# Patient Record
Sex: MALE | Race: WHITE | NOT HISPANIC OR LATINO | Employment: UNEMPLOYED | URBAN - NONMETROPOLITAN AREA
[De-identification: names, ages, dates, MRNs, and addresses within clinical notes are randomized per-mention and may not be internally consistent; named-entity substitution may affect disease eponyms.]

---
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
## 2025-01-31 ENCOUNTER — OFFICE VISIT (OUTPATIENT)
Dept: INTERNAL MEDICINE | Facility: CLINIC | Age: 25
End: 2025-01-31
Payer: COMMERCIAL

## 2025-01-31 ENCOUNTER — HOSPITAL ENCOUNTER (OUTPATIENT)
Dept: GENERAL RADIOLOGY | Facility: HOSPITAL | Age: 25
Discharge: HOME | End: 2025-01-31
Admitting: INTERNAL MEDICINE
Payer: COMMERCIAL

## 2025-01-31 VITALS
SYSTOLIC BLOOD PRESSURE: 130 MMHG | BODY MASS INDEX: 23.85 KG/M2 | TEMPERATURE: 97.1 F | WEIGHT: 161 LBS | HEART RATE: 84 BPM | DIASTOLIC BLOOD PRESSURE: 84 MMHG | OXYGEN SATURATION: 99 % | HEIGHT: 69 IN

## 2025-01-31 DIAGNOSIS — M54.50 CHRONIC MIDLINE LOW BACK PAIN WITHOUT SCIATICA: ICD-10-CM

## 2025-01-31 DIAGNOSIS — S61.239A PUNCTURE WOUND OF FINGER, INITIAL ENCOUNTER: ICD-10-CM

## 2025-01-31 DIAGNOSIS — Z76.89 ENCOUNTER TO ESTABLISH CARE WITH NEW DOCTOR: Primary | ICD-10-CM

## 2025-01-31 DIAGNOSIS — G89.29 CHRONIC MIDLINE LOW BACK PAIN WITHOUT SCIATICA: ICD-10-CM

## 2025-01-31 PROCEDURE — 72100 X-RAY EXAM L-S SPINE 2/3 VWS: CPT

## 2025-01-31 NOTE — PROGRESS NOTES
"    Chief Complaint  Establish Care (Former patient of Dr. Cameron Cobian. ), Back Pain (Lower back pain.  ), and Hand Pain (Second finger on the left hand is numb x 2 days.  Drill slipped while installing issa. )    Subjective        Antwon Terry presents to [facility]  Back Pain    Hand Pain       See below.     Objective   Vital Signs:  /84 (BP Location: Left arm, Patient Position: Sitting, Cuff Size: Adult)   Pulse 84   Temp 97.1 °F (36.2 °C) (Temporal)   Ht 174 cm (68.5\")   Wt 73 kg (161 lb)   SpO2 99%   BMI 24.12 kg/m²   Estimated body mass index is 24.12 kg/m² as calculated from the following:    Height as of this encounter: 174 cm (68.5\").    Weight as of this encounter: 73 kg (161 lb).     BMI is within normal parameters. No other follow-up for BMI required.    Physical Exam  HENT:      Head: Normocephalic and atraumatic.      Right Ear: Tympanic membrane and ear canal normal.      Left Ear: Tympanic membrane and ear canal normal.      Mouth/Throat:      Mouth: Mucous membranes are moist.   Eyes:      Conjunctiva/sclera: Conjunctivae normal.      Pupils: Pupils are equal, round, and reactive to light.   Cardiovascular:      Rate and Rhythm: Normal rate and regular rhythm.      Heart sounds: Normal heart sounds.   Pulmonary:      Effort: Pulmonary effort is normal. No respiratory distress.      Breath sounds: Normal breath sounds.   Musculoskeletal:         General: No swelling.      Cervical back: Neck supple.      Comments: Back exam is unremarkable.  No pain with straight leg raising.  No tenderness to palpation.  No paraspinal spasm.  No discomfort in the SI joints.   Lymphadenopathy:      Cervical: No cervical adenopathy.   Skin:     General: Skin is warm and dry.      Findings: No rash.      Comments: Small puncture wound to the distal tip of the left middle finger that is not inflamed or infected.   Neurological:      General: No focal deficit present. " "     Mental Status: He is alert and oriented to person, place, and time.   Psychiatric:         Mood and Affect: Mood normal.         Behavior: Behavior normal.         Thought Content: Thought content normal.         Judgment: Judgment normal.        Result Review :  Nothing available to review.         Assessment and Plan   Diagnoses and all orders for this visit:    1. Encounter to establish care with new doctor (Primary)    2. Chronic midline low back pain without sciatica  -     XR Spine Lumbar 2 or 3 View; Future    3. Puncture wound of finger, initial encounter  -     Tdap Vaccine => 6yo IM (BOOSTRIX/ADACEL)       Presents today to establish care.    He states the only reason that he came today is because his girlfriend urged him to.    He has been having some fairly nondescript low back pain off and on for the better part of the year.  He states that it is very sporadic.  The pain tends to be fleeting.  It is better with activity.  He is not have any discomfort at present.  He does not remember any specific injuries.  He does work as a  and owns his own business.  He does not have any myelopathic or radicular symptoms.  Feel that we should start with plain films.  He is in agreement.  He uses ibuprofen occasionally.  Consider moist heat, stretching.  No time for PT and may not necessarily be needed.  He states that from his standpoint this is not that big of an issue.  He believes that one of the provoking issues is that his girlfriend has a \"terrible couch.\"  He \"sinks into it and after lying there for a while he gets uncomfortable.\"    He was working with a drill recently and made a small puncture wound to the distal left middle finger.  No break to the drill bit.  This has not been inflamed.  This does not bother him very much.  He does have a bit of numbness to the finger pad.  This should likely improve.  He cannot remember the last time that he had a tetanus so we provided a Tdap " today.    Plan to see him back in 6 months for his physical.  He knows that he can reach out sooner if problems.  We can get fasting labs at that time as there is no urgency to do so today.  He understands that after that visit he will likely only need to have an annual physical once yearly.      Follow Up   Return in about 6 months (around 7/31/2025) for Annual physical.  Patient was given instructions and counseling regarding his condition or for health maintenance advice. Please see specific information pulled into the AVS if appropriate.      DAMARIS Posadas DO       Electronically signed by SARANYA Posadas DO, 01/31/25, 3:16 PM CST.